# Patient Record
Sex: FEMALE | Race: WHITE | NOT HISPANIC OR LATINO | Employment: FULL TIME | URBAN - NONMETROPOLITAN AREA
[De-identification: names, ages, dates, MRNs, and addresses within clinical notes are randomized per-mention and may not be internally consistent; named-entity substitution may affect disease eponyms.]

---

## 2024-11-18 ENCOUNTER — HOSPITAL ENCOUNTER (EMERGENCY)
Facility: HOSPITAL | Age: 39
Discharge: HOME | End: 2024-11-19
Attending: EMERGENCY MEDICINE
Payer: COMMERCIAL

## 2024-11-18 DIAGNOSIS — R10.33 PERIUMBILICAL ABDOMINAL PAIN: Primary | ICD-10-CM

## 2024-11-18 DIAGNOSIS — K80.20 CHOLELITHIASIS: ICD-10-CM

## 2024-11-18 DIAGNOSIS — N39.0 UTI (URINARY TRACT INFECTION): ICD-10-CM

## 2024-11-18 LAB
BASOPHILS # BLD AUTO: 0.04 THOUSANDS/ÂΜL (ref 0–0.1)
BASOPHILS NFR BLD AUTO: 1 % (ref 0–1)
EOSINOPHIL # BLD AUTO: 0.09 THOUSAND/ÂΜL (ref 0–0.61)
EOSINOPHIL NFR BLD AUTO: 1 % (ref 0–6)
ERYTHROCYTE [DISTWIDTH] IN BLOOD BY AUTOMATED COUNT: 12.3 % (ref 11.6–15.1)
HCT VFR BLD AUTO: 39.7 % (ref 34.8–46.1)
HGB BLD-MCNC: 13.2 G/DL (ref 11.5–15.4)
IMM GRANULOCYTES # BLD AUTO: 0.02 THOUSAND/UL (ref 0–0.2)
IMM GRANULOCYTES NFR BLD AUTO: 0 % (ref 0–2)
LYMPHOCYTES # BLD AUTO: 2.86 THOUSANDS/ÂΜL (ref 0.6–4.47)
LYMPHOCYTES NFR BLD AUTO: 34 % (ref 14–44)
MCH RBC QN AUTO: 28.9 PG (ref 26.8–34.3)
MCHC RBC AUTO-ENTMCNC: 33.2 G/DL (ref 31.4–37.4)
MCV RBC AUTO: 87 FL (ref 82–98)
MONOCYTES # BLD AUTO: 0.55 THOUSAND/ÂΜL (ref 0.17–1.22)
MONOCYTES NFR BLD AUTO: 7 % (ref 4–12)
NEUTROPHILS # BLD AUTO: 4.75 THOUSANDS/ÂΜL (ref 1.85–7.62)
NEUTS SEG NFR BLD AUTO: 57 % (ref 43–75)
NRBC BLD AUTO-RTO: 0 /100 WBCS
PLATELET # BLD AUTO: 314 THOUSANDS/UL (ref 149–390)
PMV BLD AUTO: 9.4 FL (ref 8.9–12.7)
RBC # BLD AUTO: 4.57 MILLION/UL (ref 3.81–5.12)
WBC # BLD AUTO: 8.31 THOUSAND/UL (ref 4.31–10.16)

## 2024-11-18 PROCEDURE — 36415 COLL VENOUS BLD VENIPUNCTURE: CPT | Performed by: EMERGENCY MEDICINE

## 2024-11-18 PROCEDURE — 96374 THER/PROPH/DIAG INJ IV PUSH: CPT

## 2024-11-18 PROCEDURE — 80053 COMPREHEN METABOLIC PANEL: CPT | Performed by: EMERGENCY MEDICINE

## 2024-11-18 PROCEDURE — 85610 PROTHROMBIN TIME: CPT | Performed by: EMERGENCY MEDICINE

## 2024-11-18 PROCEDURE — 96361 HYDRATE IV INFUSION ADD-ON: CPT

## 2024-11-18 PROCEDURE — 96375 TX/PRO/DX INJ NEW DRUG ADDON: CPT

## 2024-11-18 PROCEDURE — 99284 EMERGENCY DEPT VISIT MOD MDM: CPT

## 2024-11-18 PROCEDURE — 83605 ASSAY OF LACTIC ACID: CPT | Performed by: EMERGENCY MEDICINE

## 2024-11-18 PROCEDURE — 83690 ASSAY OF LIPASE: CPT | Performed by: EMERGENCY MEDICINE

## 2024-11-18 PROCEDURE — 99285 EMERGENCY DEPT VISIT HI MDM: CPT | Performed by: EMERGENCY MEDICINE

## 2024-11-18 PROCEDURE — 81025 URINE PREGNANCY TEST: CPT | Performed by: EMERGENCY MEDICINE

## 2024-11-18 PROCEDURE — 85025 COMPLETE CBC W/AUTO DIFF WBC: CPT | Performed by: EMERGENCY MEDICINE

## 2024-11-18 PROCEDURE — 85730 THROMBOPLASTIN TIME PARTIAL: CPT | Performed by: EMERGENCY MEDICINE

## 2024-11-18 RX ORDER — MORPHINE SULFATE 4 MG/ML
4 INJECTION, SOLUTION INTRAMUSCULAR; INTRAVENOUS ONCE
Status: COMPLETED | OUTPATIENT
Start: 2024-11-18 | End: 2024-11-18

## 2024-11-18 RX ORDER — ONDANSETRON 2 MG/ML
4 INJECTION INTRAMUSCULAR; INTRAVENOUS ONCE
Status: COMPLETED | OUTPATIENT
Start: 2024-11-18 | End: 2024-11-18

## 2024-11-18 RX ADMIN — ONDANSETRON 4 MG: 2 INJECTION INTRAMUSCULAR; INTRAVENOUS at 23:39

## 2024-11-18 RX ADMIN — MORPHINE SULFATE 4 MG: 4 INJECTION INTRAVENOUS at 23:42

## 2024-11-18 RX ADMIN — SODIUM CHLORIDE 1000 ML: 0.9 INJECTION, SOLUTION INTRAVENOUS at 23:39

## 2024-11-19 ENCOUNTER — APPOINTMENT (EMERGENCY)
Dept: CT IMAGING | Facility: HOSPITAL | Age: 39
End: 2024-11-19
Payer: COMMERCIAL

## 2024-11-19 VITALS
TEMPERATURE: 97.3 F | OXYGEN SATURATION: 97 % | HEART RATE: 64 BPM | HEIGHT: 64 IN | SYSTOLIC BLOOD PRESSURE: 116 MMHG | DIASTOLIC BLOOD PRESSURE: 61 MMHG | RESPIRATION RATE: 14 BRPM

## 2024-11-19 LAB
ALBUMIN SERPL BCG-MCNC: 4.3 G/DL (ref 3.5–5)
ALP SERPL-CCNC: 39 U/L (ref 34–104)
ALT SERPL W P-5'-P-CCNC: 11 U/L (ref 7–52)
ANION GAP SERPL CALCULATED.3IONS-SCNC: 10 MMOL/L (ref 4–13)
APTT PPP: 26 SECONDS (ref 23–34)
AST SERPL W P-5'-P-CCNC: 12 U/L (ref 13–39)
BACTERIA UR QL AUTO: ABNORMAL /HPF
BILIRUB SERPL-MCNC: 0.51 MG/DL (ref 0.2–1)
BILIRUB UR QL STRIP: NEGATIVE
BUN SERPL-MCNC: 12 MG/DL (ref 5–25)
CALCIUM SERPL-MCNC: 9.4 MG/DL (ref 8.4–10.2)
CHLORIDE SERPL-SCNC: 101 MMOL/L (ref 96–108)
CLARITY UR: CLEAR
CO2 SERPL-SCNC: 25 MMOL/L (ref 21–32)
COLOR UR: YELLOW
CREAT SERPL-MCNC: 1.05 MG/DL (ref 0.6–1.3)
EXT PREGNANCY TEST URINE: NEGATIVE
EXT. CONTROL: NORMAL
GFR SERPL CREATININE-BSD FRML MDRD: 68 ML/MIN/1.73SQ M
GLUCOSE SERPL-MCNC: 95 MG/DL (ref 65–140)
GLUCOSE UR STRIP-MCNC: NEGATIVE MG/DL
HGB UR QL STRIP.AUTO: NEGATIVE
INR PPP: 0.88 (ref 0.85–1.19)
KETONES UR STRIP-MCNC: ABNORMAL MG/DL
LACTATE SERPL-SCNC: 1.2 MMOL/L (ref 0.5–2)
LEUKOCYTE ESTERASE UR QL STRIP: NEGATIVE
LIPASE SERPL-CCNC: 63 U/L (ref 11–82)
NITRITE UR QL STRIP: POSITIVE
NON-SQ EPI CELLS URNS QL MICRO: ABNORMAL /HPF
PH UR STRIP.AUTO: 8 [PH]
POTASSIUM SERPL-SCNC: 3.6 MMOL/L (ref 3.5–5.3)
PROT SERPL-MCNC: 7.8 G/DL (ref 6.4–8.4)
PROT UR STRIP-MCNC: NEGATIVE MG/DL
PROTHROMBIN TIME: 12.4 SECONDS (ref 12.3–15)
RBC #/AREA URNS AUTO: ABNORMAL /HPF
SODIUM SERPL-SCNC: 136 MMOL/L (ref 135–147)
SP GR UR STRIP.AUTO: 1.02 (ref 1–1.03)
UROBILINOGEN UR QL STRIP.AUTO: 0.2 E.U./DL
WBC #/AREA URNS AUTO: ABNORMAL /HPF

## 2024-11-19 PROCEDURE — 81001 URINALYSIS AUTO W/SCOPE: CPT | Performed by: EMERGENCY MEDICINE

## 2024-11-19 PROCEDURE — 74177 CT ABD & PELVIS W/CONTRAST: CPT

## 2024-11-19 PROCEDURE — 96361 HYDRATE IV INFUSION ADD-ON: CPT

## 2024-11-19 RX ORDER — CEPHALEXIN 500 MG/1
500 CAPSULE ORAL 4 TIMES DAILY
Qty: 28 CAPSULE | Refills: 0 | Status: SHIPPED | OUTPATIENT
Start: 2024-11-19 | End: 2024-11-26

## 2024-11-19 RX ADMIN — CEPHALEXIN 500 MG: 250 CAPSULE ORAL at 02:07

## 2024-11-19 RX ADMIN — IOHEXOL 100 ML: 350 INJECTION, SOLUTION INTRAVENOUS at 01:37

## 2024-11-19 NOTE — ED NOTES
Correction to entry 0205 departure entry: pt has ride home with friend.     Viktoria Bueno, KARLEE  11/19/24 9513

## 2024-11-19 NOTE — ED PROVIDER NOTES
Time reflects when diagnosis was documented in both MDM as applicable and the Disposition within this note       Time User Action Codes Description Comment    11/19/2024  1:59 AM Bipin Soto [R10.33] Periumbilical abdominal pain     11/19/2024  2:00 AM Bipin Soto [N39.0] UTI (urinary tract infection)     11/19/2024  2:00 AM Bipin Soto [K80.20] Cholelithiasis           ED Disposition       ED Disposition   Discharge    Condition   Stable    Date/Time   Tue Nov 19, 2024  1:59 AM    Comment   Pham Valderrama discharge to home/self care.                   Assessment & Plan       Medical Decision Making  Differential diagnosis included but not limited to pancreatitis cholecystitis bowel obstruction colitis diverticulitis.  Patient remained clinically and hemodynamically stable in the emergency department she is afebrile nontoxic well-appearing workup in the ED reveals no evidence of acute intra-abdominal pathology.  Patient noted to have incidental findings of urinary tract infection gallstones and contracted gallbladder.  No evidence of acute cholecystitis clinically or on workup in the ED at this time patient felt improved with rest fluids and meds given in the ED. for now recommend antibiotics for urinary tract infection plenty fluids bland diet and supportive care and prompt follow-up with primary physician and general surgery referral provided for further evaluation and treatment and obtain test results return precautions and anticipatory guidance discussed.      Problems Addressed:  Cholelithiasis: acute illness or injury  Periumbilical abdominal pain: acute illness or injury  UTI (urinary tract infection): acute illness or injury    Amount and/or Complexity of Data Reviewed  Labs: ordered.  Radiology: ordered.    Risk  Prescription drug management.             Medications   cephalexin (KEFLEX) capsule 500 mg (has no administration in time range)   sodium chloride 0.9 % bolus 1,000 mL (0 mL  Intravenous Stopped 11/19/24 0128)   morphine injection 4 mg (4 mg Intravenous Given 11/18/24 2342)   ondansetron (ZOFRAN) injection 4 mg (4 mg Intravenous Given 11/18/24 2339)   iohexol (OMNIPAQUE) 350 MG/ML injection (MULTI-DOSE) 100 mL (100 mL Intravenous Given 11/19/24 0137)       ED Risk Strat Scores                           SBIRT 22yo+      Flowsheet Row Most Recent Value   Initial Alcohol Screen: US AUDIT-C     1. How often do you have a drink containing alcohol? 0 Filed at: 11/18/2024 2320   2. How many drinks containing alcohol do you have on a typical day you are drinking?  0 Filed at: 11/18/2024 2320   3b. FEMALE Any Age, or MALE 65+: How often do you have 4 or more drinks on one occassion? 0 Filed at: 11/18/2024 2320   Audit-C Score 0 Filed at: 11/18/2024 2320   CEM: How many times in the past year have you...    Used an illegal drug or used a prescription medication for non-medical reasons? Never Filed at: 11/18/2024 2320                            History of Present Illness       Chief Complaint   Patient presents with    Abdominal Pain     Pt reports constant, sharp mid abdominal pain that started after eating dinner. Also reports N/V. Took 800mg motrin prior to arrival, provided no relief.        History reviewed. No pertinent past medical history.   Past Surgical History:   Procedure Laterality Date    WISDOM TOOTH EXTRACTION        History reviewed. No pertinent family history.   Social History     Tobacco Use    Smoking status: Never    Smokeless tobacco: Never   Vaping Use    Vaping status: Never Used   Substance Use Topics    Alcohol use: Never    Drug use: Never      E-Cigarette/Vaping    E-Cigarette Use Never User       E-Cigarette/Vaping Substances      I have reviewed and agree with the history as documented.     Patient is a 39-year-old female no significant past medical or surgical history presents the emergency department complaint of mid abdominal pain associate with nausea and  vomiting abrupt onset this evening around 7 PM no diarrhea or constipation no fevers or chills.        History provided by:  Patient  Abdominal Pain  Pain location:  Periumbilical  Pain quality: sharp    Associated symptoms: nausea and vomiting    Associated symptoms: no chest pain, no constipation, no diarrhea, no fever and no shortness of breath        Review of Systems   Constitutional:  Negative for fever.   Respiratory:  Negative for shortness of breath.    Cardiovascular:  Negative for chest pain.   Gastrointestinal:  Positive for abdominal pain, nausea and vomiting. Negative for constipation and diarrhea.   All other systems reviewed and are negative.          Objective       ED Triage Vitals [11/18/24 2318]   Temperature Pulse Blood Pressure Respirations SpO2 Patient Position - Orthostatic VS   (!) 97.3 °F (36.3 °C) 76 120/76 20 98 % Sitting      Temp Source Heart Rate Source BP Location FiO2 (%) Pain Score    Temporal Monitor Left arm -- 8      Vitals      Date and Time Temp Pulse SpO2 Resp BP Pain Score FACES Pain Rating User   11/19/24 0030 -- 55 98 % 14 109/70 -- -- NM   11/18/24 2342 -- -- -- -- -- 4 -- NM   11/18/24 2318 97.3 °F (36.3 °C) 76 98 % 20 120/76 8 -- AR            Physical Exam  Vitals and nursing note reviewed.   Constitutional:       General: She is not in acute distress.     Appearance: Normal appearance.   HENT:      Head: Normocephalic and atraumatic.      Nose: Nose normal.   Eyes:      Conjunctiva/sclera: Conjunctivae normal.   Pulmonary:      Effort: Pulmonary effort is normal. No respiratory distress.   Abdominal:      Tenderness: There is generalized abdominal tenderness and tenderness in the periumbilical area. There is no guarding or rebound.   Skin:     General: Skin is dry.   Neurological:      General: No focal deficit present.      Mental Status: She is alert and oriented to person, place, and time.         Results Reviewed       Procedure Component Value Units Date/Time     Urine Microscopic [[id number]]  (Abnormal) Collected: 11/19/24 0128    Lab Status: Final result Specimen: Urine, Clean Catch Updated: 11/19/24 0145     RBC, UA 0-1 /hpf      WBC, UA 2-4 /hpf      Epithelial Cells Moderate /hpf      Bacteria, UA Innumerable /hpf     UA w Reflex to Microscopic w Reflex to Culture [[id number]]  (Abnormal) Collected: 11/19/24 0128    Lab Status: Final result Specimen: Urine, Clean Catch Updated: 11/19/24 0137     Color, UA Yellow     Clarity, UA Clear     Specific Gravity, UA 1.020     pH, UA 8.0     Leukocytes, UA Negative     Nitrite, UA Positive     Protein, UA Negative mg/dl      Glucose, UA Negative mg/dl      Ketones, UA Trace mg/dl      Urobilinogen, UA 0.2 E.U./dl      Bilirubin, UA Negative     Occult Blood, UA Negative    POCT pregnancy, urine [[id number]]  (Normal) Collected: 11/19/24 0129    Lab Status: Final result Updated: 11/19/24 0129     EXT Preg Test, Ur Negative     Control Valid    Protime-INR [[id number]]  (Normal) Collected: 11/18/24 2338    Lab Status: Final result Specimen: Blood from Arm, Left Updated: 11/19/24 0010     Protime 12.4 seconds      INR 0.88    Narrative:      INR Therapeutic Range    Indication                                             INR Range      Atrial Fibrillation                                               2.0-3.0  Hypercoagulable State                                    2.0.2.3  Left Ventricular Asist Device                            2.0-3.0  Mechanical Heart Valve                                  -    Aortic(with afib, MI, embolism, HF, LA enlargement,    and/or coagulopathy)                                     2.0-3.0 (2.5-3.5)     Mitral                                                             2.5-3.5  Prosthetic/Bioprosthetic Heart Valve               2.0-3.0  Venous thromboembolism (VTE: VT, PE        2.0-3.0    APTT [[id number]]  (Normal) Collected: 11/18/24 2338    Lab Status: Final result Specimen: Blood from Arm, Left Updated:  11/19/24 0010     PTT 26 seconds     Comprehensive metabolic panel [[id number]]  (Abnormal) Collected: 11/18/24 2338    Lab Status: Final result Specimen: Blood from Arm, Left Updated: 11/19/24 0009     Sodium 136 mmol/L      Potassium 3.6 mmol/L      Chloride 101 mmol/L      CO2 25 mmol/L      ANION GAP 10 mmol/L      BUN 12 mg/dL      Creatinine 1.05 mg/dL      Glucose 95 mg/dL      Calcium 9.4 mg/dL      AST 12 U/L      ALT 11 U/L      Alkaline Phosphatase 39 U/L      Total Protein 7.8 g/dL      Albumin 4.3 g/dL      Total Bilirubin 0.51 mg/dL      eGFR 68 ml/min/1.73sq m     Narrative:      [employer] guidelines for Chronic Kidney Disease (CKD):     Stage 1 with normal or high GFR (GFR > 90 mL/min/1.73 square meters)    Stage 2 Mild CKD (GFR = 60-89 mL/min/1.73 square meters)    Stage 3A Moderate CKD (GFR = 45-59 mL/min/1.73 square meters)    Stage 3B Moderate CKD (GFR = 30-44 mL/min/1.73 square meters)    Stage 4 Severe CKD (GFR = 15-29 mL/min/1.73 square meters)    Stage 5 End Stage CKD (GFR <15 mL/min/1.73 square meters)  Note: GFR calculation is accurate only with a steady state creatinine    Lipase [[id number]]  (Normal) Collected: 11/18/24 2338    Lab Status: Final result Specimen: Blood from Arm, Left Updated: 11/19/24 0009     Lipase 63 u/L     Lactic acid, plasma (w/reflex if result > 2.0) [[id number]]  (Normal) Collected: 11/18/24 2338    Lab Status: Final result Specimen: Blood from Arm, Left Updated: 11/19/24 0008     LACTIC ACID 1.2 mmol/L     Narrative:      Result may be elevated if tourniquet was used during collection.    CBC and differential [[id number]] Collected: 11/18/24 2338    Lab Status: Final result Specimen: Blood from Arm, Left Updated: 11/18/24 2351     WBC 8.31 Thousand/uL      RBC 4.57 Million/uL      Hemoglobin 13.2 g/dL      Hematocrit 39.7 %      MCV 87 fL      MCH 28.9 pg      MCHC 33.2 g/dL      RDW 12.3 %      MPV 9.4 fL      Platelets 314 Thousands/uL       nRBC 0 /100 WBCs      Segmented % 57 %      Immature Grans % 0 %      Lymphocytes % 34 %      Monocytes % 7 %      Eosinophils Relative 1 %      Basophils Relative 1 %      Absolute Neutrophils 4.75 Thousands/µL      Absolute Immature Grans 0.02 Thousand/uL      Absolute Lymphocytes 2.86 Thousands/µL      Absolute Monocytes 0.55 Thousand/µL      Eosinophils Absolute 0.09 Thousand/µL      Basophils Absolute 0.04 Thousands/µL             CT abdomen pelvis with contrast   Final Interpretation by Hamilton Centeno MD (11/19 0156)      No acute findings in the abdomen or pelvis.      Small amount of pelvic free fluid likely physiologic.      Cholelithiasis with no pericholecystic inflammatory changes. Contracted gallbladder.      Workstation performed: [id number]             Procedures    ED Medication and Procedure Management   None     Patient's Medications   Discharge Prescriptions    CEPHALEXIN (KEFLEX) 500 MG CAPSULE    Take 1 capsule (500 mg total) by mouth 4 (four) times a day for 7 days       Start Date: 11/19/2024End Date: 11/26/2024       Order Dose: 500 mg       Quantity: 28 capsule    Refills: 0       ED SEPSIS DOCUMENTATION   Time reflects when diagnosis was documented in both MDM as applicable and the Disposition within this note       Time User Action Codes Description Comment    11/19/2024  1:59 AM Bipin Soto [R10.33] Periumbilical abdominal pain     11/19/2024  2:00 AM Bipin Soto [N39.0] UTI (urinary tract infection)     11/19/2024  2:00 AM Bipin Soto [K80.20] Cholelithiasis                  Bipin Soto DO  11/19/24 0204

## 2024-11-19 NOTE — ED NOTES
Pt made aware to notify nurse when she feels she can provide urine specimen.     Viktoria Bueno, KARLEE  11/19/24 011

## 2025-01-31 ENCOUNTER — HOSPITAL ENCOUNTER (EMERGENCY)
Facility: HOSPITAL | Age: 40
Discharge: HOME | End: 2025-01-31
Attending: EMERGENCY MEDICINE | Admitting: EMERGENCY MEDICINE
Payer: COMMERCIAL

## 2025-01-31 VITALS
DIASTOLIC BLOOD PRESSURE: 82 MMHG | RESPIRATION RATE: 18 BRPM | SYSTOLIC BLOOD PRESSURE: 123 MMHG | OXYGEN SATURATION: 100 % | HEART RATE: 63 BPM | BODY MASS INDEX: 35 KG/M2 | HEIGHT: 64 IN | WEIGHT: 205 LBS | TEMPERATURE: 97.8 F

## 2025-01-31 DIAGNOSIS — S09.90XA INJURY OF HEAD, INITIAL ENCOUNTER: Primary | ICD-10-CM

## 2025-01-31 PROCEDURE — 99283 EMERGENCY DEPT VISIT LOW MDM: CPT

## 2025-01-31 PROCEDURE — 99283 EMERGENCY DEPT VISIT LOW MDM: CPT | Performed by: PHYSICIAN ASSISTANT

## 2025-01-31 NOTE — Clinical Note
Pham Valderrama was seen and treated in our emergency department on 1/31/2025.                Diagnosis:     Pham  may return to work on return date.    She may return on this date: 02/01/2025         If you have any questions or concerns, please don't hesitate to call.      Pamela Alvarez PA-C    ______________________________           _______________          _______________  Hospital Representative                              Date                                Time

## 2025-01-31 NOTE — DISCHARGE INSTRUCTIONS
Rest, alternate between Tylenol and Motrin as needed.  Avoid screens as you are able over the next day or so as we discussed and you are healing from your recent fall.  Follow-up with your family doctor.  Additionally I put in a referral to our concussion program for appropriate follow-up.  Please return to the emergency department any new or worsening symptoms.

## 2025-01-31 NOTE — ED PROVIDER NOTES
Time reflects when diagnosis was documented in both MDM as applicable and the Disposition within this note       Time User Action Codes Description Comment    1/31/2025 10:57 AM AntonioPamela Add [S09.90XA] Injury of head, initial encounter           ED Disposition       ED Disposition   Discharge    Condition   Stable    Date/Time   Fri Jan 31, 2025 10:57 AM    Comment   Pham Valderrama discharge to home/self care.                   Assessment & Plan       Medical Decision Making  40-year-old female presented to the emergency department for evaluation status post fall with head strike.  Vitals and medical record reviewed.  Patient at risk for the following but not limited to headache, concussion, fracture, subdural hematoma.  No palpable deformities of the scalp, lower concern for fracture.  PERRLA.  No hemotympanum.  Low concern for intracranial bleeding, I did offer the patient CT imaging which she refused at this time.  Will have patient follow-up with her concussion program.  We discussed symptomatic treatment at home return precautions and follow-up and patient verbalized understanding.  She is clinically hemodynamically stable for discharge.    Problems Addressed:  Injury of head, initial encounter: acute illness or injury             Medications - No data to display    ED Risk Strat Scores                          SBIRT 22yo+      Flowsheet Row Most Recent Value   Initial Alcohol Screen: US AUDIT-C     1. How often do you have a drink containing alcohol? 0 Filed at: 01/31/2025 1035   2. How many drinks containing alcohol do you have on a typical day you are drinking?  0 Filed at: 01/31/2025 1035   3b. FEMALE Any Age, or MALE 65+: How often do you have 4 or more drinks on one occassion? 0 Filed at: 01/31/2025 1035   Audit-C Score 0 Filed at: 01/31/2025 1035   CEM: How many times in the past year have you...    Used an illegal drug or used a prescription medication for non-medical reasons? Never Filed at:  01/31/2025 1035                            History of Present Illness       Chief Complaint   Patient presents with    Head Injury     Pt reports fall on ice this AM, struck head on ground, right sided headache, no LOC/BT use- pt denies nausea or photo sensitivity        History reviewed. No pertinent past medical history.   Past Surgical History:   Procedure Laterality Date    WISDOM TOOTH EXTRACTION        History reviewed. No pertinent family history.   Social History     Tobacco Use    Smoking status: Never    Smokeless tobacco: Never   Vaping Use    Vaping status: Never Used   Substance Use Topics    Alcohol use: Never    Drug use: Never      E-Cigarette/Vaping    E-Cigarette Use Never User       E-Cigarette/Vaping Substances      I have reviewed and agree with the history as documented.     41 year old female presents the ED for evaluation s/p fall. States she was walking outside and slipped on ice. She fall and hit the back of her head on ice on a grassy/dirt patch. Reports she laid there for a minute and was able to get up. No loc.  States this happened around 7am. States her first work break is around 9am and she was feeling tired and noticed a headache which is what prompted her to the ED. No wounds. No blood tinner. No visual changes. No nausea or vomiting. No other injury from the fall.           Review of Systems   Constitutional:  Negative for appetite change, fatigue and fever.   HENT: Negative.     Respiratory: Negative.     Musculoskeletal: Negative.  Negative for back pain and neck pain.   Skin: Negative.    Neurological:  Positive for headaches. Negative for dizziness, syncope and light-headedness.   All other systems reviewed and are negative.          Objective       ED Triage Vitals [01/31/25 1036]   Temperature Pulse Blood Pressure Respirations SpO2 Patient Position - Orthostatic VS   97.8 °F (36.6 °C) 63 123/82 18 100 % Sitting      Temp Source Heart Rate Source BP Location FiO2 (%) Pain  Score    Temporal Monitor Left arm -- 2      Vitals      Date and Time Temp Pulse SpO2 Resp BP Pain Score FACES Pain Rating User   01/31/25 1036 97.8 °F (36.6 °C) 63 100 % 18 123/82 2 -- SS            Physical Exam  Vitals and nursing note reviewed.   Constitutional:       General: She is not in acute distress.     Appearance: Normal appearance. She is not ill-appearing, toxic-appearing or diaphoretic.   HENT:      Head: Normocephalic and atraumatic.      Right Ear: Tympanic membrane, ear canal and external ear normal.      Left Ear: Tympanic membrane, ear canal and external ear normal.      Nose: Nose normal.   Eyes:      General: No visual field deficit.     Extraocular Movements: Extraocular movements intact.      Conjunctiva/sclera: Conjunctivae normal.      Pupils: Pupils are equal, round, and reactive to light.   Pulmonary:      Effort: Pulmonary effort is normal.   Musculoskeletal:         General: Normal range of motion.   Skin:     General: Skin is warm and dry.      Findings: No bruising or erythema.   Neurological:      General: No focal deficit present.      Mental Status: She is alert and oriented to person, place, and time.      GCS: GCS eye subscore is 4. GCS verbal subscore is 5. GCS motor subscore is 6.      Sensory: Sensation is intact.      Motor: Motor function is intact.      Coordination: Coordination is intact.      Gait: Gait is intact.   Psychiatric:         Mood and Affect: Mood normal.         Results Reviewed       None            No orders to display       Procedures    ED Medication and Procedure Management   None     There are no discharge medications for this patient.      ED SEPSIS DOCUMENTATION   Time reflects when diagnosis was documented in both MDM as applicable and the Disposition within this note       Time User Action Codes Description Comment    1/31/2025 10:57 AM Pamela Alvarez Add [S09.90XA] Injury of head, initial encounter                  Pamela Alvarez PA-C  01/31/25  1109       Pamela Alvarez PA-C  01/31/25 1118